# Patient Record
Sex: FEMALE | Race: WHITE | NOT HISPANIC OR LATINO
[De-identification: names, ages, dates, MRNs, and addresses within clinical notes are randomized per-mention and may not be internally consistent; named-entity substitution may affect disease eponyms.]

---

## 2022-06-27 ENCOUNTER — APPOINTMENT (OUTPATIENT)
Dept: ORTHOPEDIC SURGERY | Facility: CLINIC | Age: 57
End: 2022-06-27
Payer: COMMERCIAL

## 2022-06-27 VITALS — BODY MASS INDEX: 51.61 KG/M2 | HEIGHT: 59 IN | WEIGHT: 256 LBS

## 2022-06-27 DIAGNOSIS — I51.9 HEART DISEASE, UNSPECIFIED: ICD-10-CM

## 2022-06-27 PROCEDURE — 99203 OFFICE O/P NEW LOW 30 MIN: CPT

## 2022-06-27 PROCEDURE — 73110 X-RAY EXAM OF WRIST: CPT | Mod: 50

## 2022-06-27 NOTE — PHYSICAL EXAM
[Left] : left hand [Right] : right hand [1st] : 1st [CMC Joint] : CMC joint [Bilateral] : wrists bilaterally [] : negative compression [de-identified] : mild  [FreeTextEntry8] : RIGHT stage 3 first CMC arthritis, LEFT stage 2 first CMC arthritis.

## 2022-06-27 NOTE — HISTORY OF PRESENT ILLNESS
[5] : 5 [2] : 2 [Burning] : burning [Shooting] : shooting [Ice] : ice [Heat] : heat [Full time] : Work status: full time [de-identified] : 57 year old female presents for burning and tingling in the LEFT hand for the past few year, worsened over the past 1 year. Pain shoots up her fingers. Occasionally awoken at night.  [] : no [FreeTextEntry1] : mirlande hands  [FreeTextEntry5] : pt states no injury has occurred, started 1 week ago  [de-identified] : overuse  [de-identified] :

## 2022-07-25 ENCOUNTER — APPOINTMENT (OUTPATIENT)
Dept: ORTHOPEDIC SURGERY | Facility: CLINIC | Age: 57
End: 2022-07-25

## 2022-07-25 VITALS — HEIGHT: 59 IN | BODY MASS INDEX: 51.61 KG/M2 | WEIGHT: 256 LBS

## 2022-07-25 PROCEDURE — 99213 OFFICE O/P EST LOW 20 MIN: CPT | Mod: 25

## 2022-07-25 PROCEDURE — 20605 DRAIN/INJ JOINT/BURSA W/O US: CPT | Mod: 50

## 2022-07-25 NOTE — DISCUSSION/SUMMARY
[Medication Risks Reviewed] : Medication risks reviewed [de-identified] : Ena wished to receive injection in bilateral 1st CMC

## 2022-07-25 NOTE — PROCEDURE
[Medium Joint Injection] : medium joint injection [Bilateral] : bilaterally of the [CMC Joint] : CMC joint [Pain] : pain [Inflammation] : inflammation [Alcohol] : alcohol [___ cc    1%] : Lidocaine ~Vcc of 1%  [___ cc    10mg] : Triamcinolone (Kenalog) ~Vcc of 10 mg

## 2022-07-25 NOTE — PHYSICAL EXAM
[Left] : left hand [Right] : right hand [1st] : 1st [CMC Joint] : CMC joint [Bilateral] : wrists bilaterally [] : negative compression [de-identified] : mild  [FreeTextEntry8] : RIGHT stage 3 first CMC arthritis, LEFT stage 2 first CMC arthritis.

## 2022-07-25 NOTE — HISTORY OF PRESENT ILLNESS
[5] : 5 [1] : 2 [Full time] : Work status: full time [de-identified] : 57 year old female being followed for b/l CTS and b/l CMC arthritis. Has been night time splinting for CTS and splinting during the day for CMC arthritis.   She states the night time symptoms are improved with the spliting at night.  Parasthsias nearly resolved since wearing night time splints.  Still with pain at the base of her thumbs [de-identified] : no treatment at this time

## 2022-08-22 ENCOUNTER — APPOINTMENT (OUTPATIENT)
Dept: ORTHOPEDIC SURGERY | Facility: CLINIC | Age: 57
End: 2022-08-22

## 2022-08-22 VITALS — HEIGHT: 59 IN | BODY MASS INDEX: 51.61 KG/M2 | WEIGHT: 256 LBS

## 2022-08-22 DIAGNOSIS — G56.01 CARPAL TUNNEL SYNDROME, RIGHT UPPER LIMB: ICD-10-CM

## 2022-08-22 DIAGNOSIS — M18.11 UNILATERAL PRIMARY OSTEOARTHRITIS OF FIRST CARPOMETACARPAL JOINT, RIGHT HAND: ICD-10-CM

## 2022-08-22 DIAGNOSIS — M18.12 UNILATERAL PRIMARY OSTEOARTHRITIS OF FIRST CARPOMETACARPAL JOINT, LEFT HAND: ICD-10-CM

## 2022-08-22 DIAGNOSIS — G56.02 CARPAL TUNNEL SYNDROME, LEFT UPPER LIMB: ICD-10-CM

## 2022-08-22 PROCEDURE — 99213 OFFICE O/P EST LOW 20 MIN: CPT

## 2022-08-22 NOTE — HISTORY OF PRESENT ILLNESS
[4] : 4 [0] : 0 [Full time] : Work status: full time [de-identified] : 57 year old female followed for b/l CTS and c/l CMC arthritis. CTS symptoms were improved, nearly resolved at the last visit.  4 weeks s/p b/l CMC CSI with good relief. Still with relief.  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